# Patient Record
Sex: MALE | URBAN - METROPOLITAN AREA
[De-identification: names, ages, dates, MRNs, and addresses within clinical notes are randomized per-mention and may not be internally consistent; named-entity substitution may affect disease eponyms.]

---

## 2023-06-05 ENCOUNTER — HOME HEALTH ADMISSION (OUTPATIENT)
Dept: HOME HEALTH SERVICES | Facility: HOME HEALTH | Age: 78
End: 2023-06-05
Payer: MEDICARE

## 2023-06-06 ENCOUNTER — HOME CARE VISIT (OUTPATIENT)
Dept: SCHEDULING | Facility: HOME HEALTH | Age: 78
End: 2023-06-06

## 2023-06-06 PROCEDURE — G0299 HHS/HOSPICE OF RN EA 15 MIN: HCPCS

## 2023-06-06 PROCEDURE — 0221000100 HH NO PAY CLAIM PROCEDURE

## 2023-06-07 VITALS
DIASTOLIC BLOOD PRESSURE: 72 MMHG | SYSTOLIC BLOOD PRESSURE: 124 MMHG | RESPIRATION RATE: 18 BRPM | HEART RATE: 97 BPM | OXYGEN SATURATION: 98 % | TEMPERATURE: 97.7 F

## 2023-06-07 ASSESSMENT — ENCOUNTER SYMPTOMS: DYSPNEA ACTIVITY LEVEL: AFTER AMBULATING LESS THAN 10 FT

## 2023-06-07 NOTE — HOME HEALTH
Patient is a 69 year y/o/m. Patient underwent endobronchial valve placement and transferred to SNF. Pt has no open wounds at this time. Past medical history of emphysema, COPD, HTN, hypercholesterolemia, GERD, sleep apne and oxygen dependant. Patient lives in single story home with significant other. Patient alert & oriented x 4, VS stable, denies any new open wounds or rashes, denies any falls since returning home. Pt has follow up apt with PCP scheduled. All questions/concerns addressed, will continue to [facility]. POC approval received from PCP. Caregiver involvement: significant other    Medications reconciled and all medications are available. Home health supplies by type and quantity ordered/delivered this visit include: NA    Patient education provided this visit: SN educated patient and patient's caregiver on Admission process, Call us first, Patient and patient caregiver verbalizes understanding via the teach back method.     Progress toward goals: [facility] today     Home exercise program: continue as ordered    Plan for next visit: medication/disease teaching/management    The following discharge planning was discussed with the patient/ patient's caregiver: DC when goals met

## 2023-06-09 ENCOUNTER — HOME CARE VISIT (OUTPATIENT)
Dept: SCHEDULING | Facility: HOME HEALTH | Age: 78
End: 2023-06-09

## 2023-06-09 VITALS
SYSTOLIC BLOOD PRESSURE: 120 MMHG | TEMPERATURE: 97.5 F | RESPIRATION RATE: 18 BRPM | OXYGEN SATURATION: 98 % | DIASTOLIC BLOOD PRESSURE: 72 MMHG | HEART RATE: 102 BPM

## 2023-06-09 PROCEDURE — G0299 HHS/HOSPICE OF RN EA 15 MIN: HCPCS

## 2023-06-09 PROCEDURE — G0152 HHCP-SERV OF OT,EA 15 MIN: HCPCS

## 2023-06-09 PROCEDURE — G0151 HHCP-SERV OF PT,EA 15 MIN: HCPCS

## 2023-06-09 NOTE — HOME HEALTH
Pateint is 70 yo male , RH referred to home Health sec surgery of respiratory system, centrilobular enphisema, diabetic complecation. Hospitalization followed by rehab for aprox 10 days prior to return to home. PMH: L finger trigger release  DME: 2ww, O2  Pt currently at home in a [facility] family home with his significant other as primary CG , his daughter is visisting to assist pt as needed. Pt using O2 @ 3 lt with O2 sat at 92-97%. Pt presents with increased HR on exertion as well as SOB . He presents with fair insight into current deficits. He requires extra time with all functional activitites and reports the MARI scale / perceived exertion at 9/10 perceiving activities as very hard. Patient requires CGA to min A with self care and extra time,he is currently using 2ww for ambulation and using the portable O2, he repors he is not using the concentrator very frequently. Pt displays WNL B UE ROM with mild discomfort and decreased R hsoulder flex to 160 degrees. BUE MMT is 4-/5 to 3+/5. He required assistance with toilet and shower transfer, the toilet is low and shower lacks grab bars. Pt was provided with AD including BSC  and bathroom grab bars. Resources were provided . Pt could benefit from OT to addrss ADL's safety and indep as well as energy conservation and proper breathing tech . CG trraining.

## 2023-06-10 VITALS
TEMPERATURE: 98.7 F | OXYGEN SATURATION: 99 % | HEART RATE: 80 BPM | DIASTOLIC BLOOD PRESSURE: 76 MMHG | RESPIRATION RATE: 18 BRPM | SYSTOLIC BLOOD PRESSURE: 132 MMHG

## 2023-06-10 NOTE — HOME HEALTH
Client is 69year old male client with SOB with exertion as he does use 02 and is on 3.0L via nasal canula along with RLE pain as he was instructed to TTWB to take stress off his RLE secondary to pain. Patient needs to work on short frequent exercise sessions to redue pain and edema as he has +2 edema to reduce gastroc swelling. The patient presents with complaints of right lower extremity pain and experiences shortness of breath with exertion, which significantly limits their endurance capacity and functional mobility. The patient expresses a strong motivation to engage in therapy and regain independence, aiming to reduce reliance on a two-wheeled walker for ambulation. Objective:  During today's session, the patient's right lower extremity pain was observed during functional movements and ambulation. The pain appears to limit the patient's ability to perform activities requiring weight-bearing and mobility. Furthermore, the patient experienced shortness of breath with exertion, resulting in decreased endurance capacity and reduced functional mobility. Assessment:    Right lower extremity pain: The patient reports pain in the right lower extremity, limiting weight-bearing activities and mobility. Shortness of breath with exertion: The patient experiences respiratory difficulties with exertion, impacting endurance capacity and functional mobility. Motivation for therapy: The patient demonstrates a strong motivation to actively engage in therapy and regain independence in ambulation. Plan:    Pain management: Implement interventions to address and alleviate right lower extremity pain, such as manual therapy techniques, therapeutic exercises, and modalities. Endurance training: Gradually incorporate endurance exercises and activities to improve the patient's endurance capacity, monitoring respiratory status closely. Gait training and mobility exercises:  Focus on improving the patient's gait pattern and

## 2023-06-11 VITALS
DIASTOLIC BLOOD PRESSURE: 72 MMHG | RESPIRATION RATE: 18 BRPM | SYSTOLIC BLOOD PRESSURE: 120 MMHG | OXYGEN SATURATION: 97 % | TEMPERATURE: 97.5 F | HEART RATE: 97 BPM

## 2023-06-11 ASSESSMENT — ENCOUNTER SYMPTOMS
DYSPNEA ACTIVITY LEVEL: AFTER AMBULATING LESS THAN 10 FT
PAIN LOCATION - PAIN QUALITY: BURN

## 2023-06-15 ENCOUNTER — HOME CARE VISIT (OUTPATIENT)
Dept: HOME HEALTH SERVICES | Facility: HOME HEALTH | Age: 78
End: 2023-06-15
Payer: MEDICARE

## 2023-06-20 ENCOUNTER — HOME CARE VISIT (OUTPATIENT)
Dept: SCHEDULING | Facility: HOME HEALTH | Age: 78
End: 2023-06-20
Payer: MEDICARE

## 2023-06-20 VITALS
RESPIRATION RATE: 18 BRPM | DIASTOLIC BLOOD PRESSURE: 80 MMHG | OXYGEN SATURATION: 99 % | HEART RATE: 96 BPM | TEMPERATURE: 97.4 F | SYSTOLIC BLOOD PRESSURE: 112 MMHG

## 2023-06-20 VITALS
DIASTOLIC BLOOD PRESSURE: 68 MMHG | SYSTOLIC BLOOD PRESSURE: 110 MMHG | TEMPERATURE: 97.6 F | RESPIRATION RATE: 18 BRPM | HEART RATE: 90 BPM

## 2023-06-20 PROCEDURE — G0299 HHS/HOSPICE OF RN EA 15 MIN: HCPCS

## 2023-06-20 PROCEDURE — G0157 HHC PT ASSISTANT EA 15: HCPCS

## 2023-06-20 ASSESSMENT — ENCOUNTER SYMPTOMS: DYSPNEA ACTIVITY LEVEL: AFTER AMBULATING 10 - 20 FT

## 2023-06-20 NOTE — HOME HEALTH
Pt is doing good and states he has started walking around his home without AD. Pt has been advised he should be using AD at all times until he is stronger and safer with transfers and ambulation. Pt has been able to increase overall LE strength allowing for improved STST with decreased need for assistance during STST VC needed MATTHEW and tolerance. Pt will continue to improve safety aroiund the home decreasing his need for spouse to help with ADL's. Pt completed balance ex's at the counter in order to increase safety and improve proprioception as well as improved somatosensory.  GT performed 125' freq rest needed for SOB VC on MATTHEW and COG as well as increased DF

## 2023-06-20 NOTE — HOME HEALTH
SN for skilled assessments, education/management of medications, safety and disease process. Progressing in teaching and improving in care. VS all wnl, denies any changes in medication, shob, falls, any new open wounds, problems/questions at this time. No s/s of distress during SN visit. Aware and approves of next scheduled SN visit. Plan for next visit:  medication/disease teaching/managment.

## 2023-06-21 ENCOUNTER — HOME CARE VISIT (OUTPATIENT)
Dept: SCHEDULING | Facility: HOME HEALTH | Age: 78
End: 2023-06-21
Payer: MEDICARE

## 2023-06-21 VITALS
SYSTOLIC BLOOD PRESSURE: 120 MMHG | OXYGEN SATURATION: 99 % | DIASTOLIC BLOOD PRESSURE: 62 MMHG | TEMPERATURE: 98.8 F | HEART RATE: 96 BPM

## 2023-06-21 PROCEDURE — G0158 HHC OT ASSISTANT EA 15: HCPCS

## 2023-06-21 ASSESSMENT — ENCOUNTER SYMPTOMS: PAIN LOCATION - PAIN QUALITY: ACHE

## 2023-06-21 NOTE — HOME HEALTH
Pt refused Tx last week, but agreeable today to a point. Pt reproted having no incidents or med changes, REproted pain in (L) shoulder 5/10 with AROM . at proxima;l biceps area. Instructed in pain management with stretch, ROM , ice/ heat , massage to area provided with some relief and easier ROM in horizantal Adduction . Wife present for Tx for follw through aiwht energy conservation and pacing training , provided with hnad outs on Energy con and hydration . Pt loosing interest easily and becoming slightly impatient , wanting to get back on computer a couple times during Tx. Encouraged getting up off computer chair often , sitting unsupported at computer for UE AROM and resistance exercises . Pt expressed understanding and demosntarted a couple reps of each exercise with encouragement to do so. Pt will be seen By OTR next tx. Pt and wife stated INdep to MOd I with all ADL .

## 2023-06-22 ENCOUNTER — HOME CARE VISIT (OUTPATIENT)
Dept: SCHEDULING | Facility: HOME HEALTH | Age: 78
End: 2023-06-22
Payer: MEDICARE

## 2023-06-22 VITALS
RESPIRATION RATE: 18 BRPM | SYSTOLIC BLOOD PRESSURE: 125 MMHG | OXYGEN SATURATION: 98 % | DIASTOLIC BLOOD PRESSURE: 65 MMHG | HEART RATE: 92 BPM | TEMPERATURE: 97.7 F

## 2023-06-22 VITALS
SYSTOLIC BLOOD PRESSURE: 120 MMHG | DIASTOLIC BLOOD PRESSURE: 80 MMHG | HEART RATE: 78 BPM | RESPIRATION RATE: 18 BRPM | TEMPERATURE: 97.9 F

## 2023-06-22 PROCEDURE — G0152 HHCP-SERV OF OT,EA 15 MIN: HCPCS

## 2023-06-22 PROCEDURE — G0157 HHC PT ASSISTANT EA 15: HCPCS

## 2023-06-22 NOTE — HOME HEALTH
Patieent agreeable to participate in functional mobility traning and bathroom accessibility. Pt reports no falls or incidents since last visit. He has been dc from OT with goals met. Pt able to complete am self care with short rest periods using O2 at 2 lt , he / CG report he is using O2 even in the shower and O2 sat is stable above 92%. Pt requieres vc to slow down and pace him self , however, he verbalizes energy conservation tech. Pt reports he feels stronger and reports  he is doing am  self care without help. No further OT is recommended at this time.

## 2023-06-22 NOTE — HOME HEALTH
Pt is saul black and cotninues to progress with PT POC and increases hios ability to improve ADL's and decrease his need for assistance and return to PLOF. PT has been increasing his activity levels and is still having trouble with fatigue and has cotninued to show improved safety with STST as well as ambulation increasing Mel and imporved postural awareness. Pt complete ' uneven surface no AD VC and TC on oinvreased MEL and DF during ambulation to decrease risk of falls.

## 2023-06-28 ENCOUNTER — HOME CARE VISIT (OUTPATIENT)
Dept: SCHEDULING | Facility: HOME HEALTH | Age: 78
End: 2023-06-28
Payer: MEDICARE

## 2023-06-28 PROCEDURE — G0157 HHC PT ASSISTANT EA 15: HCPCS

## 2023-06-29 ENCOUNTER — HOME CARE VISIT (OUTPATIENT)
Dept: SCHEDULING | Facility: HOME HEALTH | Age: 78
End: 2023-06-29
Payer: MEDICARE

## 2023-06-29 VITALS
RESPIRATION RATE: 16 BRPM | HEART RATE: 95 BPM | OXYGEN SATURATION: 98 % | DIASTOLIC BLOOD PRESSURE: 64 MMHG | TEMPERATURE: 97.6 F | SYSTOLIC BLOOD PRESSURE: 124 MMHG

## 2023-06-29 PROCEDURE — G0299 HHS/HOSPICE OF RN EA 15 MIN: HCPCS

## 2023-06-29 PROCEDURE — G0151 HHCP-SERV OF PT,EA 15 MIN: HCPCS

## 2023-06-29 ASSESSMENT — ENCOUNTER SYMPTOMS: DYSPNEA ACTIVITY LEVEL: AFTER AMBULATING 10 - 20 FT

## 2023-06-30 VITALS
SYSTOLIC BLOOD PRESSURE: 120 MMHG | TEMPERATURE: 98.2 F | HEART RATE: 78 BPM | DIASTOLIC BLOOD PRESSURE: 80 MMHG | RESPIRATION RATE: 18 BRPM

## 2023-07-03 ASSESSMENT — ENCOUNTER SYMPTOMS: HEMOPTYSIS: 0

## 2023-07-05 VITALS
TEMPERATURE: 97.7 F | OXYGEN SATURATION: 98 % | HEART RATE: 70 BPM | RESPIRATION RATE: 18 BRPM | DIASTOLIC BLOOD PRESSURE: 76 MMHG | SYSTOLIC BLOOD PRESSURE: 132 MMHG

## 2023-07-05 NOTE — HOME HEALTH
Kenisha Kinsey has met all goals for discharge as he was able to score 26/2 on his Tinetti balance assessment, along with ambulating over 400 feet and was able to do all transfers and mobility independently with greater ease of transfers secondary to glut and hmastinrg strengthening improvements with also greater hip accleration from glut sury improvements. HEP addional exercises given and established to educate the patient on proper body mechanics and techniques to ensure safe and effective exercise performance. Provide fall prevention strategies and tips for maintaining balance during daily activities. Emphasize the importance of regular physical activity and encourage the patient to engage in additional activities such as walking or swimming, with appropriate modifications if necessary. Follow-up:    Schedule regular follow-up appointments to monitor progress, address any concerns, and modify the home exercise program as needed. At discharge, it is recommended that the patient follows up with their primary care physician (PCP). This step is important to ensure continuity of care and to discuss the need for further physical therapy intervention if required in the future. Additionally, the patient should continue to adhere to the HEP provided, incorporating it into their daily routine. Regular exercise and physical activity will help maintain the achieved progress and promote long-term functional gains as client was educated to keep doing her open and closed chain strengthening exercises in multiple planes of motion along with ambulating in and out of home with Lala Ruvalcaba